# Patient Record
Sex: MALE | Race: WHITE | HISPANIC OR LATINO | URBAN - METROPOLITAN AREA
[De-identification: names, ages, dates, MRNs, and addresses within clinical notes are randomized per-mention and may not be internally consistent; named-entity substitution may affect disease eponyms.]

---

## 2020-11-05 ENCOUNTER — INPATIENT (INPATIENT)
Facility: HOSPITAL | Age: 50
LOS: 0 days | Discharge: HOME | DRG: 394 | End: 2020-11-06
Attending: INTERNAL MEDICINE | Admitting: INTERNAL MEDICINE
Payer: MEDICAID

## 2020-11-05 VITALS
TEMPERATURE: 98 F | RESPIRATION RATE: 19 BRPM | HEART RATE: 86 BPM | HEIGHT: 67 IN | WEIGHT: 214.95 LBS | SYSTOLIC BLOOD PRESSURE: 174 MMHG | OXYGEN SATURATION: 98 % | DIASTOLIC BLOOD PRESSURE: 116 MMHG

## 2020-11-05 NOTE — ED ADULT TRIAGE NOTE - PATIENT ON (OXYGEN DELIVERY METHOD)
decreased ability to use arms for pushing/pulling/decreased ability to use legs for bridging/pushing/impaired ability to control trunk for mobility
room air

## 2020-11-06 VITALS
DIASTOLIC BLOOD PRESSURE: 88 MMHG | SYSTOLIC BLOOD PRESSURE: 156 MMHG | RESPIRATION RATE: 18 BRPM | TEMPERATURE: 98 F | OXYGEN SATURATION: 98 % | HEART RATE: 89 BPM

## 2020-11-06 DIAGNOSIS — T18.5XXA FOREIGN BODY IN ANUS AND RECTUM, INITIAL ENCOUNTER: ICD-10-CM

## 2020-11-06 DIAGNOSIS — R03.0 ELEVATED BLOOD-PRESSURE READING, WITHOUT DIAGNOSIS OF HYPERTENSION: ICD-10-CM

## 2020-11-06 DIAGNOSIS — Z29.9 ENCOUNTER FOR PROPHYLACTIC MEASURES, UNSPECIFIED: ICD-10-CM

## 2020-11-06 LAB
24R-OH-CALCIDIOL SERPL-MCNC: 22.2 NG/ML — LOW (ref 30–80)
ALBUMIN SERPL ELPH-MCNC: 3.5 G/DL — SIGNIFICANT CHANGE UP (ref 3.5–5)
ALBUMIN SERPL ELPH-MCNC: 3.9 G/DL — SIGNIFICANT CHANGE UP (ref 3.5–5)
ALP SERPL-CCNC: 58 U/L — SIGNIFICANT CHANGE UP (ref 40–120)
ALP SERPL-CCNC: 63 U/L — SIGNIFICANT CHANGE UP (ref 40–120)
ALT FLD-CCNC: 37 U/L DA — SIGNIFICANT CHANGE UP (ref 10–60)
ALT FLD-CCNC: 42 U/L DA — SIGNIFICANT CHANGE UP (ref 10–60)
ANION GAP SERPL CALC-SCNC: 5 MMOL/L — SIGNIFICANT CHANGE UP (ref 5–17)
ANION GAP SERPL CALC-SCNC: 6 MMOL/L — SIGNIFICANT CHANGE UP (ref 5–17)
APTT BLD: 32 SEC — SIGNIFICANT CHANGE UP (ref 27.5–35.5)
AST SERPL-CCNC: 22 U/L — SIGNIFICANT CHANGE UP (ref 10–40)
AST SERPL-CCNC: 25 U/L — SIGNIFICANT CHANGE UP (ref 10–40)
BASOPHILS # BLD AUTO: 0.03 K/UL — SIGNIFICANT CHANGE UP (ref 0–0.2)
BASOPHILS # BLD AUTO: 0.03 K/UL — SIGNIFICANT CHANGE UP (ref 0–0.2)
BASOPHILS NFR BLD AUTO: 0.3 % — SIGNIFICANT CHANGE UP (ref 0–2)
BASOPHILS NFR BLD AUTO: 0.4 % — SIGNIFICANT CHANGE UP (ref 0–2)
BILIRUB SERPL-MCNC: 1.6 MG/DL — HIGH (ref 0.2–1.2)
BILIRUB SERPL-MCNC: 1.6 MG/DL — HIGH (ref 0.2–1.2)
BUN SERPL-MCNC: 14 MG/DL — SIGNIFICANT CHANGE UP (ref 7–18)
BUN SERPL-MCNC: 15 MG/DL — SIGNIFICANT CHANGE UP (ref 7–18)
CALCIUM SERPL-MCNC: 8.4 MG/DL — SIGNIFICANT CHANGE UP (ref 8.4–10.5)
CALCIUM SERPL-MCNC: 8.9 MG/DL — SIGNIFICANT CHANGE UP (ref 8.4–10.5)
CHLORIDE SERPL-SCNC: 105 MMOL/L — SIGNIFICANT CHANGE UP (ref 96–108)
CHLORIDE SERPL-SCNC: 105 MMOL/L — SIGNIFICANT CHANGE UP (ref 96–108)
CO2 SERPL-SCNC: 27 MMOL/L — SIGNIFICANT CHANGE UP (ref 22–31)
CO2 SERPL-SCNC: 28 MMOL/L — SIGNIFICANT CHANGE UP (ref 22–31)
CREAT SERPL-MCNC: 0.88 MG/DL — SIGNIFICANT CHANGE UP (ref 0.5–1.3)
CREAT SERPL-MCNC: 0.98 MG/DL — SIGNIFICANT CHANGE UP (ref 0.5–1.3)
EOSINOPHIL # BLD AUTO: 0.05 K/UL — SIGNIFICANT CHANGE UP (ref 0–0.5)
EOSINOPHIL # BLD AUTO: 0.05 K/UL — SIGNIFICANT CHANGE UP (ref 0–0.5)
EOSINOPHIL NFR BLD AUTO: 0.6 % — SIGNIFICANT CHANGE UP (ref 0–6)
EOSINOPHIL NFR BLD AUTO: 0.6 % — SIGNIFICANT CHANGE UP (ref 0–6)
GLUCOSE BLDC GLUCOMTR-MCNC: 103 MG/DL — HIGH (ref 70–99)
GLUCOSE BLDC GLUCOMTR-MCNC: 116 MG/DL — HIGH (ref 70–99)
GLUCOSE SERPL-MCNC: 105 MG/DL — HIGH (ref 70–99)
GLUCOSE SERPL-MCNC: 97 MG/DL — SIGNIFICANT CHANGE UP (ref 70–99)
HCT VFR BLD CALC: 43.5 % — SIGNIFICANT CHANGE UP (ref 39–50)
HCT VFR BLD CALC: 46.1 % — SIGNIFICANT CHANGE UP (ref 39–50)
HGB BLD-MCNC: 14.4 G/DL — SIGNIFICANT CHANGE UP (ref 13–17)
HGB BLD-MCNC: 15.2 G/DL — SIGNIFICANT CHANGE UP (ref 13–17)
IMM GRANULOCYTES NFR BLD AUTO: 0.1 % — SIGNIFICANT CHANGE UP (ref 0–1.5)
IMM GRANULOCYTES NFR BLD AUTO: 0.2 % — SIGNIFICANT CHANGE UP (ref 0–1.5)
INR BLD: 1.04 RATIO — SIGNIFICANT CHANGE UP (ref 0.88–1.16)
LYMPHOCYTES # BLD AUTO: 2.63 K/UL — SIGNIFICANT CHANGE UP (ref 1–3.3)
LYMPHOCYTES # BLD AUTO: 2.81 K/UL — SIGNIFICANT CHANGE UP (ref 1–3.3)
LYMPHOCYTES # BLD AUTO: 30.8 % — SIGNIFICANT CHANGE UP (ref 13–44)
LYMPHOCYTES # BLD AUTO: 32.4 % — SIGNIFICANT CHANGE UP (ref 13–44)
MAGNESIUM SERPL-MCNC: 2 MG/DL — SIGNIFICANT CHANGE UP (ref 1.6–2.6)
MCHC RBC-ENTMCNC: 30.7 PG — SIGNIFICANT CHANGE UP (ref 27–34)
MCHC RBC-ENTMCNC: 30.8 PG — SIGNIFICANT CHANGE UP (ref 27–34)
MCHC RBC-ENTMCNC: 33 GM/DL — SIGNIFICANT CHANGE UP (ref 32–36)
MCHC RBC-ENTMCNC: 33.1 GM/DL — SIGNIFICANT CHANGE UP (ref 32–36)
MCV RBC AUTO: 92.9 FL — SIGNIFICANT CHANGE UP (ref 80–100)
MCV RBC AUTO: 93.1 FL — SIGNIFICANT CHANGE UP (ref 80–100)
MONOCYTES # BLD AUTO: 0.72 K/UL — SIGNIFICANT CHANGE UP (ref 0–0.9)
MONOCYTES # BLD AUTO: 0.75 K/UL — SIGNIFICANT CHANGE UP (ref 0–0.9)
MONOCYTES NFR BLD AUTO: 8.4 % — SIGNIFICANT CHANGE UP (ref 2–14)
MONOCYTES NFR BLD AUTO: 8.7 % — SIGNIFICANT CHANGE UP (ref 2–14)
NEUTROPHILS # BLD AUTO: 5.01 K/UL — SIGNIFICANT CHANGE UP (ref 1.8–7.4)
NEUTROPHILS # BLD AUTO: 5.11 K/UL — SIGNIFICANT CHANGE UP (ref 1.8–7.4)
NEUTROPHILS NFR BLD AUTO: 57.8 % — SIGNIFICANT CHANGE UP (ref 43–77)
NEUTROPHILS NFR BLD AUTO: 59.7 % — SIGNIFICANT CHANGE UP (ref 43–77)
NRBC # BLD: 0 /100 WBCS — SIGNIFICANT CHANGE UP (ref 0–0)
NRBC # BLD: 0 /100 WBCS — SIGNIFICANT CHANGE UP (ref 0–0)
PHOSPHATE SERPL-MCNC: 3.5 MG/DL — SIGNIFICANT CHANGE UP (ref 2.5–4.5)
PLATELET # BLD AUTO: 276 K/UL — SIGNIFICANT CHANGE UP (ref 150–400)
PLATELET # BLD AUTO: 286 K/UL — SIGNIFICANT CHANGE UP (ref 150–400)
POTASSIUM SERPL-MCNC: 3.6 MMOL/L — SIGNIFICANT CHANGE UP (ref 3.5–5.3)
POTASSIUM SERPL-MCNC: 3.7 MMOL/L — SIGNIFICANT CHANGE UP (ref 3.5–5.3)
POTASSIUM SERPL-SCNC: 3.6 MMOL/L — SIGNIFICANT CHANGE UP (ref 3.5–5.3)
POTASSIUM SERPL-SCNC: 3.7 MMOL/L — SIGNIFICANT CHANGE UP (ref 3.5–5.3)
PROT SERPL-MCNC: 7 G/DL — SIGNIFICANT CHANGE UP (ref 6–8.3)
PROT SERPL-MCNC: 7.6 G/DL — SIGNIFICANT CHANGE UP (ref 6–8.3)
PROTHROM AB SERPL-ACNC: 12.3 SEC — SIGNIFICANT CHANGE UP (ref 10.6–13.6)
RBC # BLD: 4.68 M/UL — SIGNIFICANT CHANGE UP (ref 4.2–5.8)
RBC # BLD: 4.95 M/UL — SIGNIFICANT CHANGE UP (ref 4.2–5.8)
RBC # FLD: 12.6 % — SIGNIFICANT CHANGE UP (ref 10.3–14.5)
RBC # FLD: 12.6 % — SIGNIFICANT CHANGE UP (ref 10.3–14.5)
SARS-COV-2 IGG SERPL QL IA: NEGATIVE — SIGNIFICANT CHANGE UP
SARS-COV-2 IGM SERPL IA-ACNC: 0.09 INDEX — SIGNIFICANT CHANGE UP
SARS-COV-2 RNA SPEC QL NAA+PROBE: SIGNIFICANT CHANGE UP
SODIUM SERPL-SCNC: 138 MMOL/L — SIGNIFICANT CHANGE UP (ref 135–145)
SODIUM SERPL-SCNC: 138 MMOL/L — SIGNIFICANT CHANGE UP (ref 135–145)
TSH SERPL-MCNC: 1.53 UU/ML — SIGNIFICANT CHANGE UP (ref 0.34–4.82)
VIT B12 SERPL-MCNC: 358 PG/ML — SIGNIFICANT CHANGE UP (ref 232–1245)
WBC # BLD: 8.55 K/UL — SIGNIFICANT CHANGE UP (ref 3.8–10.5)
WBC # BLD: 8.67 K/UL — SIGNIFICANT CHANGE UP (ref 3.8–10.5)
WBC # FLD AUTO: 8.55 K/UL — SIGNIFICANT CHANGE UP (ref 3.8–10.5)
WBC # FLD AUTO: 8.67 K/UL — SIGNIFICANT CHANGE UP (ref 3.8–10.5)

## 2020-11-06 PROCEDURE — 88300 SURGICAL PATH GROSS: CPT | Mod: 26

## 2020-11-06 PROCEDURE — 86850 RBC ANTIBODY SCREEN: CPT

## 2020-11-06 PROCEDURE — 45915 REMOVE RECTAL OBSTRUCTION: CPT

## 2020-11-06 PROCEDURE — 85730 THROMBOPLASTIN TIME PARTIAL: CPT

## 2020-11-06 PROCEDURE — 83735 ASSAY OF MAGNESIUM: CPT

## 2020-11-06 PROCEDURE — 74176 CT ABD & PELVIS W/O CONTRAST: CPT

## 2020-11-06 PROCEDURE — 86769 SARS-COV-2 COVID-19 ANTIBODY: CPT

## 2020-11-06 PROCEDURE — 74176 CT ABD & PELVIS W/O CONTRAST: CPT | Mod: 26

## 2020-11-06 PROCEDURE — 36415 COLL VENOUS BLD VENIPUNCTURE: CPT

## 2020-11-06 PROCEDURE — 99053 MED SERV 10PM-8AM 24 HR FAC: CPT

## 2020-11-06 PROCEDURE — 80053 COMPREHEN METABOLIC PANEL: CPT

## 2020-11-06 PROCEDURE — 82962 GLUCOSE BLOOD TEST: CPT

## 2020-11-06 PROCEDURE — 86900 BLOOD TYPING SEROLOGIC ABO: CPT

## 2020-11-06 PROCEDURE — 99253 IP/OBS CNSLTJ NEW/EST LOW 45: CPT | Mod: 25

## 2020-11-06 PROCEDURE — 85025 COMPLETE CBC W/AUTO DIFF WBC: CPT

## 2020-11-06 PROCEDURE — 84443 ASSAY THYROID STIM HORMONE: CPT

## 2020-11-06 PROCEDURE — 99222 1ST HOSP IP/OBS MODERATE 55: CPT

## 2020-11-06 PROCEDURE — 82607 VITAMIN B-12: CPT

## 2020-11-06 PROCEDURE — 86901 BLOOD TYPING SEROLOGIC RH(D): CPT

## 2020-11-06 PROCEDURE — 88300 SURGICAL PATH GROSS: CPT

## 2020-11-06 PROCEDURE — 87635 SARS-COV-2 COVID-19 AMP PRB: CPT

## 2020-11-06 PROCEDURE — 84100 ASSAY OF PHOSPHORUS: CPT

## 2020-11-06 PROCEDURE — 82306 VITAMIN D 25 HYDROXY: CPT

## 2020-11-06 PROCEDURE — 99285 EMERGENCY DEPT VISIT HI MDM: CPT

## 2020-11-06 PROCEDURE — 96374 THER/PROPH/DIAG INJ IV PUSH: CPT

## 2020-11-06 PROCEDURE — 99285 EMERGENCY DEPT VISIT HI MDM: CPT | Mod: 25

## 2020-11-06 PROCEDURE — 85610 PROTHROMBIN TIME: CPT

## 2020-11-06 RX ORDER — MORPHINE SULFATE 50 MG/1
4 CAPSULE, EXTENDED RELEASE ORAL ONCE
Refills: 0 | Status: DISCONTINUED | OUTPATIENT
Start: 2020-11-06 | End: 2020-11-06

## 2020-11-06 RX ORDER — SODIUM CHLORIDE 9 MG/ML
1000 INJECTION, SOLUTION INTRAVENOUS
Refills: 0 | Status: DISCONTINUED | OUTPATIENT
Start: 2020-11-06 | End: 2020-11-06

## 2020-11-06 RX ORDER — MOXIFLOXACIN HYDROCHLORIDE TABLETS, 400 MG 400 MG/1
1 TABLET, FILM COATED ORAL
Qty: 14 | Refills: 0
Start: 2020-11-06 | End: 2020-11-12

## 2020-11-06 RX ORDER — SODIUM CHLORIDE 9 MG/ML
1000 INJECTION INTRAMUSCULAR; INTRAVENOUS; SUBCUTANEOUS
Refills: 0 | Status: DISCONTINUED | OUTPATIENT
Start: 2020-11-06 | End: 2020-11-06

## 2020-11-06 RX ORDER — SODIUM CHLORIDE 9 MG/ML
1000 INJECTION INTRAMUSCULAR; INTRAVENOUS; SUBCUTANEOUS ONCE
Refills: 0 | Status: COMPLETED | OUTPATIENT
Start: 2020-11-06 | End: 2020-11-06

## 2020-11-06 RX ORDER — MORPHINE SULFATE 50 MG/1
2 CAPSULE, EXTENDED RELEASE ORAL ONCE
Refills: 0 | Status: DISCONTINUED | OUTPATIENT
Start: 2020-11-06 | End: 2020-11-06

## 2020-11-06 RX ORDER — LISINOPRIL 2.5 MG/1
2.5 TABLET ORAL ONCE
Refills: 0 | Status: DISCONTINUED | OUTPATIENT
Start: 2020-11-06 | End: 2020-11-06

## 2020-11-06 RX ORDER — AMLODIPINE BESYLATE 2.5 MG/1
5 TABLET ORAL ONCE
Refills: 0 | Status: DISCONTINUED | OUTPATIENT
Start: 2020-11-06 | End: 2020-11-06

## 2020-11-06 RX ORDER — ACETAMINOPHEN 500 MG
650 TABLET ORAL EVERY 6 HOURS
Refills: 0 | Status: DISCONTINUED | OUTPATIENT
Start: 2020-11-06 | End: 2020-11-06

## 2020-11-06 RX ORDER — ENOXAPARIN SODIUM 100 MG/ML
40 INJECTION SUBCUTANEOUS DAILY
Refills: 0 | Status: DISCONTINUED | OUTPATIENT
Start: 2020-11-06 | End: 2020-11-06

## 2020-11-06 RX ORDER — METRONIDAZOLE 500 MG
1 TABLET ORAL
Qty: 21 | Refills: 0
Start: 2020-11-06 | End: 2020-11-12

## 2020-11-06 RX ADMIN — Medication 1 ENEMA: at 13:36

## 2020-11-06 RX ADMIN — MORPHINE SULFATE 4 MILLIGRAM(S): 50 CAPSULE, EXTENDED RELEASE ORAL at 01:05

## 2020-11-06 RX ADMIN — MORPHINE SULFATE 4 MILLIGRAM(S): 50 CAPSULE, EXTENDED RELEASE ORAL at 01:30

## 2020-11-06 RX ADMIN — SODIUM CHLORIDE 1000 MILLILITER(S): 9 INJECTION INTRAMUSCULAR; INTRAVENOUS; SUBCUTANEOUS at 02:00

## 2020-11-06 RX ADMIN — SODIUM CHLORIDE 1000 MILLILITER(S): 9 INJECTION INTRAMUSCULAR; INTRAVENOUS; SUBCUTANEOUS at 01:05

## 2020-11-06 RX ADMIN — MORPHINE SULFATE 2 MILLIGRAM(S): 50 CAPSULE, EXTENDED RELEASE ORAL at 14:13

## 2020-11-06 RX ADMIN — MORPHINE SULFATE 2 MILLIGRAM(S): 50 CAPSULE, EXTENDED RELEASE ORAL at 15:00

## 2020-11-06 NOTE — ED PROVIDER NOTE - CLINICAL SUMMARY MEDICAL DECISION MAKING FREE TEXT BOX
50 year old male with rectal foreign body. vitals WNL. PE as above. 50 year old male with rectal foreign body. vitals WNL. PE as above.  labs unremarkable. ct with anal.rectal chicken bone. unable to remove. will admit for GI dr mcgrath for colonoscopy.

## 2020-11-06 NOTE — DISCHARGE NOTE PROVIDER - NSDCMRMEDTOKEN_GEN_ALL_CORE_FT
Cipro 500 mg oral tablet: 1 tab(s) orally 2 times a day   Flagyl 500 mg oral tablet: 1 tab(s) orally 3 times a day

## 2020-11-06 NOTE — CONSULT NOTE ADULT - ASSESSMENT
50 y.o. M with ingestion of foreign body    -Foreign body removed at bedside with clamp. Bone removed from rectum. Pt tolerated procedure with alleviation of pain  -Rigid sigmoidoscopy performed at bedside. No further foreign body noted up to 15cm from anus.  -Resume diet as tolerated.  -Outpt f/u with PMD and GI   50 y.o. M with ingestion of foreign body    -Foreign body removed at bedside with clamp. Bone removed from rectum. Pt tolerated procedure with alleviation of pain  -Rigid sigmoidoscopy performed at bedside. No further foreign body noted up to 15cm from anus.  -Resume diet as tolerated.  -Recommend d/c with PO augmentin  -Outpt f/u with Dr. Cuello

## 2020-11-06 NOTE — DISCHARGE NOTE NURSING/CASE MANAGEMENT/SOCIAL WORK - PATIENT PORTAL LINK FT
You can access the FollowMyHealth Patient Portal offered by [facility] by registering at the following website: [email or web address]. By joining [facility]’s FollowMyHealth portal, you will also be able to view your health information using other applications (apps) compatible with our system.

## 2020-11-06 NOTE — H&P ADULT - NSHPREVIEWOFSYSTEMS_GEN_ALL_CORE
.  CONSTITUTIONAL: No weakness, fevers or chills  EYES/ENT: No visual changes;  No vertigo or throat pain   NECK: No pain or stiffness  RESPIRATORY: No cough, wheezing, hemoptysis; No shortness of breath  CARDIOVASCULAR: No chest pain or palpitations  GASTROINTESTINAL: No abdominal or epigastric pain. No nausea, vomiting, or hematemesis; Rectal pain  GENITOURINARY: No dysuria, frequency or hematuria  NEUROLOGICAL: No numbness or weakness  SKIN: No itching, burning, rashes, or lesions   All other review of systems is negative unless indicated above.

## 2020-11-06 NOTE — PROCEDURE NOTE - GENERAL PROCEDURE DETAILS
KAITLIN performed, foreign body palpable. Clamp inserted into rectum with guidance of KAITLIN. Bone removed. Rigid sigmoidoscopy performed after without evidence of retained FB in rectal cavity up to 15cm.

## 2020-11-06 NOTE — DISCHARGE NOTE PROVIDER - PROVIDER TOKENS
PROVIDER:[TOKEN:[[id number]]] PROVIDER:[TOKEN:[[id number]],FOLLOWUP:[2 weeks]],PROVIDER:[TOKEN:[[id number]],FOLLOWUP:[1 month]]

## 2020-11-06 NOTE — ED PROVIDER NOTE - OBJECTIVE STATEMENT
50 year old male denies PMH coming in with rectal pain. pt states yesterday ate chicken and swallowed a bone by accident- states initially it got stuck in his throat and then eventually went into stomach and he thought it was ok. States today when had a BM had severe pain. states looked outside and didn't see anything wrong and then checked inside and could feel something hard and thought it was the chicken bone. denies all other complaints.

## 2020-11-06 NOTE — H&P ADULT - HISTORY OF PRESENT ILLNESS
50 year old male with no PMH is coming in with rectal pain. Patient states yesterday ate chicken and swallowed a bone by accident, He states initially it got stuck in his throat and then eventually went into stomach and he thought it was ok. States today when had a BM had severe pain. Patient states the he looked outside and didn't see anything wrong like hemorrhoids  and then checked inside and could feel something hard and thought it was the chicken bone.   Patient denies blood pre rectum, abdominal pain    ED Course: ED physician tried to manually remove the piece without success.  CR abdomen does not show perforation     Vital Signs Last 24 Hrs  T(C): 36.8 (06 Nov 2020 03:30), Max: 36.8 (06 Nov 2020 03:30)  T(F): 98.2 (06 Nov 2020 03:30), Max: 98.2 (06 Nov 2020 03:30)  HR: 89 (06 Nov 2020 03:30) (86 - 89)  BP: 169/112 (06 Nov 2020 03:30) (169/112 - 174/116)  BP(mean): --  RR: 18 (06 Nov 2020 03:30) (18 - 19)  SpO2: 99% (06 Nov 2020 03:30) (98% - 99%) 50 year old male with no PMH is coming in with rectal pain. Patient states yesterday ate chicken and swallowed a bone by accident, He states initially it got stuck in his throat and then eventually went into stomach and he thought it was ok. States today when had a BM had severe pain. Patient states the he looked outside and didn't see anything wrong like hemorrhoids  and then checked inside and could feel something hard and thought it was the chicken bone.   Patient denies blood pre rectum, abdominal pain    ED Course: ED physician tried to manually remove the piece without success.  CR abdomen does not show perforation     Vital Signs Last 24 Hrs  T(C): 36.8 (06 Nov 2020 03:30), Max: 36.8 (06 Nov 2020 03:30)  T(F): 98.2 (06 Nov 2020 03:30), Max: 98.2 (06 Nov 2020 03:30)  HR: 89 (06 Nov 2020 03:30) (86 - 89)  BP: 169/112 (06 Nov 2020 03:30) (169/112 - 174/116)  RR: 18 (06 Nov 2020 03:30) (18 - 19)  SpO2: 99% (06 Nov 2020 03:30) (98% - 99%)

## 2020-11-06 NOTE — DISCHARGE NOTE PROVIDER - NSDCFUADDINST_GEN_ALL_CORE_FT
Folow up with PCP as outpatient  If you do not have a PCP may follow up with our Primary care provider at [address] [place] [address], Dr. Pace as per information provided.     Exercise 30 mins daily at least 5 days a week.    You will need to follow up with a stomach Doctor Gastroenterologists in about 4 to 6 weeks for a Colonoscopy if not done recently as you had evidence of inflammation in your Colon Folow up with PCP as outpatient  If you do not have a PCP may follow up with our Primary care provider at [address] [place] [address], Dr. Pace as per information provided.     Exercise 30 mins daily at least 5 days a week.    You will need to follow up with a stomach Doctor Gastroenterologists in about 4 to 6 weeks for a Colonoscopy if not done recently as you had evidence of inflammation in your Colon. You may follow up with Dr. Perea at [address] [place] [address] as advised.     You have also been provided with information for follow up at [facility] or [facility] for follow up in the event your Insurance has not kicked in.

## 2020-11-06 NOTE — CONSULT NOTE ADULT - ATTENDING COMMENTS
Pt w/ accidental ingestion of a chicken bone fragment that is now stuck in his distal rectum/anal canal causing him pain. Pt also w/ findings of acute diverticulitis on the CT though pt denies any real abd pain.    abd soft, non-distended  palpable foreign body in distal rectum which was removed w/ a clamp and rigid sigmoidoscopy performed afterwards demonstrating normal appearing mucosa w/ scant amount of blood    - okay for diet and to d/c  - recommend 7days of augmentin for mild diverticulitis  - f/u in surgery office in 1week and will also plan to schedule for colonoscopy

## 2020-11-06 NOTE — DISCHARGE NOTE PROVIDER - NSDCCPCAREPLAN_GEN_ALL_CORE_FT
PRINCIPAL DISCHARGE DIAGNOSIS  Diagnosis: Rectal foreign body, initial encounter  Assessment and Plan of Treatment: You presented because of severely painful bowel movement and felt something hard in your anus after swallowing a chick bone the day before. in the ED, ED physician tried to manually remove the piece without success.XR abdomen didnot show perforation. CT abdomen showed  A 2.9 cm radiopaque foreign body in the anus likely reflects the ingested chicken bone. No evidence for anal perforation. Mild acute diverticulitis of the proximal sigmoid colon, possibly related to passage of the foreign body along the colon. You were seen by gastroenterologist and surgery and they >>>.        SECONDARY DISCHARGE DIAGNOSES  Diagnosis: Elevated blood pressure reading  Assessment and Plan of Treatment: Your blood pressure was high at time of presentation likely due to pain and stress.Your blood pressure normalized without any intervention. You are recommended to follow up with your PCP after discharge.     PRINCIPAL DISCHARGE DIAGNOSIS  Diagnosis: Rectal foreign body, initial encounter  Assessment and Plan of Treatment: You presented because of severely painful bowel movement and felt something hard in your anus after swallowing a chick bone the day before. in the ED, ED physician tried to manually remove the piece without success.XR abdomen didnot show perforation. CT abdomen showed  A 2.9 cm radiopaque foreign body in the anus likely reflects the ingested chicken bone. No evidence for anal perforation. Mild acute diverticulitis of the proximal sigmoid colon, possibly related to passage of the foreign body along the colon. You were seen by gastroenterologist. Gastroenterology reffered to surgery.  Foreign body was removed by surgery team at bedside. You need to cointinue oral antibiotics as above for 7 days for possible infection. Please follow up with your primary care provider.        SECONDARY DISCHARGE DIAGNOSES  Diagnosis: Elevated blood pressure reading  Assessment and Plan of Treatment: Your blood pressure was high at time of presentation likely due to pain and stress.Your blood pressure normalized without any intervention. You are recommended to follow up with your PCP after discharge.     PRINCIPAL DISCHARGE DIAGNOSIS  Diagnosis: Rectal foreign body, initial encounter  Assessment and Plan of Treatment: You presented because of severely painful bowel movement and felt something hard in your anus after swallowing a chicken bone inadvertently the day before. in the ED, ED physician tried to manually remove the piece without success.X-Ray Abdomen did not show any perforation. CT abdomen showed  a 2.9 cm radiopaque foreign body in the anus likely reflects the ingested chicken bone. No evidence for anal perforation. Mild acute diverticulitis of the proximal sigmoid colon, possibly related to passage of the foreign body along the colon. You were seen by gastroenterologist. Gastroenterology referred to surgery for it to be removed given mild Diverticulitis (Inflammation of colon).   Foreign body was removed by surgery team at bedside. You need to cointinue oral antibiotics as above for 7 days for possible infection. Please follow up with your primary care provider and also with a Gastroeneterologist in about 6 weeks to have a Colonoscopy.         SECONDARY DISCHARGE DIAGNOSES  Diagnosis: Diverticulitis of sigmoid colon  Assessment and Plan of Treatment: You was noted to have some inflammation in the colon likely due to irritation from the foreign body.  You will need to follow up with a stomach Doctor Gastroenterologist in about 4 to 6 weeks for a Colonoscopy if not done recently as you had evidence of inflammation in your Colon    Diagnosis: Elevated blood pressure reading  Assessment and Plan of Treatment: Your blood pressure was high at time of presentation likely due to pain and stress.Your blood pressure normalized without any intervention. You are recommended to follow up with your PCP after discharge.     PRINCIPAL DISCHARGE DIAGNOSIS  Diagnosis: Rectal foreign body, initial encounter  Assessment and Plan of Treatment: You presented because of severely painful bowel movement and felt something hard in your anus after swallowing a chicken bone inadvertently the day before. in the ED, ED physician tried to manually remove the piece without success.X-Ray Abdomen did not show any perforation. CT abdomen showed  a 2.9 cm radiopaque foreign body in the anus likely reflects the ingested chicken bone. No evidence for anal perforation. Mild acute diverticulitis of the proximal sigmoid colon, possibly related to passage of the foreign body along the colon. You were seen by gastroenterologist. Gastroenterology referred to surgery for it to be removed given mild Diverticulitis (Inflammation of colon).   Foreign body was removed by surgery team at bedside. You need to cointinue oral antibiotics as above for 7 days for possible infection. Please follow up with your primary care provider and also with a Gastroeneterologist in about 6 weeks to have a Colonoscopy.         SECONDARY DISCHARGE DIAGNOSES  Diagnosis: Diverticulitis of sigmoid colon  Assessment and Plan of Treatment: You was noted to have some inflammation in the colon likely due to irritation from the foreign body.  You will need to follow up with a stomach Doctor Gastroenterologist in about 4 to 6 weeks for a Colonoscopy if not done recently as you had evidence of inflammation in your Colon  Please take plain Yoghurt twice daily while on antibiotics to minimize diarrhea associated with Antibiotics.    Diagnosis: Elevated blood pressure reading  Assessment and Plan of Treatment: Your blood pressure was high at time of presentation likely due to pain and stress.Your blood pressure normalized without any intervention. You are recommended to follow up with your PCP after discharge.

## 2020-11-06 NOTE — ED ADULT NURSE NOTE - OBJECTIVE STATEMENT
The patient presents with rectal pain since yesterday at 0900 post bowel movement.  He states that he ingested a chicken bone yesterday and thinks that the bone has descended in his rectum.  He denies blood in the stool, nausea, vomiting.

## 2020-11-06 NOTE — ED ADULT NURSE NOTE - CHPI ED NUR SYMPTOMS NEG
no diarrhea/no dysuria/no blood in stool/no chills/no vomiting/no hematuria/no fever/no nausea/no burning urination

## 2020-11-06 NOTE — H&P ADULT - PROBLEM SELECTOR PLAN 2
Blood pressure running high  Patient denies having blood pressure problem, however, he reports that he was prescribed Metoprolol when he was young and was took off.  High blood pressure can be due to pain also   If it is persistently high,. can give low dose lisinopril   f/u lipid profile

## 2020-11-06 NOTE — ED ADULT NURSE NOTE - NSFALLRSKPASTHIST_ED_ALL_ED
NAHUM met with patient to review 61 51 81 - pt declined signing 201 stating "I don't need to stay here, I'm only here because I have a bladder infection, I want to go home";   Pt cannot recall details of admission other than "I knew I had a bladder infection and needed to go to the hospital";  Pt AOx3 otherwise;  NAHUM will work with [facility] for discharge Friday due to lack of criteria for 303 no

## 2020-11-06 NOTE — DISCHARGE NOTE PROVIDER - HOSPITAL COURSE
50 year old male with no PMH is coming in with rectal pain. Patient states yesterday ate chicken and swallowed a bone by accident, He states initially it got stuck in his throat and then eventually went into stomach and he thought it was ok. Stated when had a BM had severe pain. Patient states the he looked outside and didn't see anything wrong like hemorrhoids  and then checked inside and could feel something hard and thought it was the chicken bone.   Patient denies blood pre rectum, abdominal pain. in the ED, ED physician tried to manually remove the piece without success.CR abdomen didnot show perforation. CT abdomen showed  A 2.9 cm radiopaque foreign body in the anus likely reflects the ingested chicken bone. No evidence for anal perforation. Mild acute diverticulitis of the proximal sigmoid colon, possibly related to passage of the foreign body along the colon. surgery and GIT were consulted .       50 year old male with no PMH is coming in with rectal pain. Patient states yesterday ate chicken and swallowed a bone by accident, He states initially it got stuck in his throat and then eventually went into stomach and he thought it was ok. Stated when had a BM had severe pain. Patient states the he looked outside and didn't see anything wrong like hemorrhoids  and then checked inside and could feel something hard and thought it was the chicken bone.   Patient denies blood pre rectum, abdominal pain. in the ED, ED physician tried to manually remove the piece without success.CR abdomen didnot show perforation. CT abdomen showed  A 2.9 cm radiopaque foreign body in the anus likely reflects the ingested chicken bone. No evidence for anal perforation. Mild acute diverticulitis of the proximal sigmoid colon, possibly related to passage of the foreign body.  Gastroenterology Dr. Story was consulted by ED who referred surgery evaluation.  Surgery removed the foreign body at bedside.   Pt is stable for discharge. Pt is being discharged  with oral antibiotics. Case has been discussed with the attending.  This is just a summary of the case. For further information please refer to pt chart document.

## 2020-11-06 NOTE — CONSULT NOTE ADULT - SUBJECTIVE AND OBJECTIVE BOX
Patient is a 50y old  Male who presents with a chief complaint of Rectal Foreign Object (06 Nov 2020 15:17)      HPI  Called see and eval 50y.o. Male w/ no PMH for ingestion of foreign body. Pt presented to [facility] ER today c/o rectal pain. Exacerbated by bearing down and squeezing his buttocks. Pain during defecation with possibly small amount of blood in stool in 1 episode. Pt states he ate chicken 2 days ago and thinks he may have swallowed a bone. Pt states he felt something hard when palpating his rectum which he thinks might be bone. Currently resting comfortably in fetal position.     MEDICATIONS  (STANDING):  dextrose 5% + sodium chloride 0.9%. 1000 milliLiter(s) (70 mL/Hr) IV Continuous <Continuous>  enoxaparin Injectable 40 milliGRAM(s) SubCutaneous daily    MEDICATIONS  (PRN):  acetaminophen   Tablet .. 650 milliGRAM(s) Oral every 6 hours PRN Mild Pain (1 - 3)    Allergies    No Known Allergies    Vital Signs Last 24 Hrs  T(C): 36.7 (06 Nov 2020 13:15), Max: 37 (06 Nov 2020 06:32)  T(F): 98.1 (06 Nov 2020 13:15), Max: 98.6 (06 Nov 2020 06:32)  HR: 89 (06 Nov 2020 13:15) (85 - 91)  BP: 156/88 (06 Nov 2020 13:15) (139/78 - 174/116)  BP(mean): --  RR: 18 (06 Nov 2020 13:15) (17 - 19)  SpO2: 98% (06 Nov 2020 13:15) (98% - 99%)    Physical:  Gen: A&Ox3. NAD  Abd: Soft ND, NT  Rectal: Good sphincter tone. Palpable sharp, hard foreign body in rectum.     RADIOLOGY & ADDITIONAL STUDIES:  < from: CT Abdomen and Pelvis No Cont (11.06.20 @ 01:28) >  BOWEL: A 2.9 cm radiopaque foreign body is seen in the anus. No extraluminal gas. No bowel obstruction. Appendix normal. Colonic diverticulosis. There is mild inflammation surrounding a proximal sigmoid diverticulum.    < end of copied text >

## 2020-11-06 NOTE — ED PROVIDER NOTE - PROGRESS NOTE DETAILS
no perforation. attempted removal with speculum and forceps but unsuccessful. GI dr mcgrath consulted- advised admission for colonoscopy in AM. will admit.

## 2020-11-06 NOTE — DISCHARGE NOTE PROVIDER - CARE PROVIDER_API CALL
Ma, [facility]  MEDICINE - [facility]  [address], [address]  [place], [place] [id number]  Phone: [phone]  Fax: [phone]  Follow Up Time:    Ma, [facility] MEDICINE  [address], [address]  [place], [place] [id number]  Phone: [phone]  Fax: [phone]  Follow Up Time: 2 weeks    Gilberto Perea)  Gastroenterology  [address] [address] [place] [address]  Phone: [phone]  Fax: [phone]  Follow Up Time: 1 month

## 2020-11-06 NOTE — H&P ADULT - ASSESSMENT
50 year old male with no PMH is coming in with rectal pain. PAtient is beng admitted to get colonoscopy for foreign body removal

## 2020-11-06 NOTE — DISCHARGE NOTE PROVIDER - CARE PROVIDERS DIRECT ADDRESSES
[email or web address]_[email or web address]

## 2020-11-06 NOTE — H&P ADULT - PROBLEM SELECTOR PLAN 3
RISK                                                          Points  [] Previous VTE                                           3  [] Thrombophilia                                        2  [] Lower limb paralysis                              2   [] Current Cancer                                       2   [x] Immobilization > 24 hrs                        1  [] ICU/CCU stay > 24 hours                       1  [x] Age > 60                                                   1    Lovenox 40mg SQ

## 2020-11-06 NOTE — H&P ADULT - ATTENDING COMMENTS
Pt seen and examined  Case discussed with MAR.  50 year old man with no medical history with rectal pain preceded by history of swallowing chicken bone with transient throat pain.     Vital Signs Last 24 Hrs  T(C): 36.7 (05 Nov 2020 23:56), Max: 36.7 (05 Nov 2020 23:56)  T(F): 98 (05 Nov 2020 23:56), Max: 98 (05 Nov 2020 23:56)  HR: 86 (05 Nov 2020 23:56) (86 - 86)  BP: 174/116 (05 Nov 2020 23:56) (174/116 - 174/116)  RR: 19 (05 Nov 2020 23:56) (19 - 19)  SpO2: 98% (05 Nov 2020 23:56) (98% - 98%)    Labs                        15.2   8.67  )-----------( 286      ( 06 Nov 2020 01:03 )             46.1       138  |  105  |  15  --------------------<  105<H>  3.7   |  28  |  0.98  Ca    8.9      06 Nov 2020 01:03  TPro  7.6  /  Alb  3.9  /  TBili  1.6<H>  /  DBili  x   /  AST  25  /  ALT  42  /  AlkPhos  63  11-06    CT abdomen /pelvis  A 2.9 cm radiopaque foreign body is seen in the anus. No extraluminal gas. No bowel obstruction. Appendix normal. Colonic diverticulosis. There is mild inflammation surrounding a proximal sigmoid diverticulum. PERITONEUM: No fluid collection, free air, or ascites.      Impression  - Impacted rectal foreign body   No evidence of rupture   GI consulted in the ED  For anoscopy /sigmoidoscopy /colonoscopy in AM  Gentle hydration  NPO  Pain control Pt seen and examined  Case discussed with MAR.  50 year old man with no medical history with rectal pain preceded by history of swallowing chicken bone with transient throat pain.     Vital Signs Last 24 Hrs  T(C): 36.7 (05 Nov 2020 23:56), Max: 36.7 (05 Nov 2020 23:56)  T(F): 98 (05 Nov 2020 23:56), Max: 98 (05 Nov 2020 23:56)  HR: 86 (05 Nov 2020 23:56) (86 - 86)  BP: 174/116 (05 Nov 2020 23:56) (174/116 - 174/116)  RR: 19 (05 Nov 2020 23:56) (19 - 19)  SpO2: 98% (05 Nov 2020 23:56) (98% - 98%)    Well built middle age man, NAD AAO X 3  Exams unremarkable     Labs                        15.2   8.67  )-----------( 286      ( 06 Nov 2020 01:03 )             46.1       138  |  105  |  15  --------------------<  105<H>  3.7   |  28  |  0.98  Ca    8.9      06 Nov 2020 01:03  TPro  7.6  /  Alb  3.9  /  TBili  1.6<H>  /  DBili  x   /  AST  25  /  ALT  42  /  AlkPhos  63  11-06    CT abdomen /pelvis  A 2.9 cm radiopaque foreign body is seen in the anus. No extraluminal gas. No bowel obstruction. Appendix normal. Colonic diverticulosis. There is mild inflammation surrounding a proximal sigmoid diverticulum. PERITONEUM: No fluid collection, free air, or ascites.      Impression  - Impacted rectal foreign body   No evidence of rupture   GI consulted in the ED  For anoscopy /sigmoidoscopy /colonoscopy in AM  Gentle hydration  NPO  Pain control

## 2020-11-06 NOTE — H&P ADULT - PROBLEM SELECTOR PLAN 1
Patient had ingested chicken bone yesterday which was no painful   Today after he had bowel movement in the morninf, he started having severe discomfort in the rectal area  Failed attempt of foreign body removal in ED   Patient denies abdominal pain, obstipation, nausea,. vomiting   Able to pass gas  Last BM in the evening and it was painful  Will keep patient NPO for now   Blood pressure monitoring as it running high,  careful IV hydration   Pain management with tylenol for now, can give toradol PRN    Dr Story consulted by ED physician, will get colonoscopy in AM

## 2020-11-10 LAB — SURGICAL PATHOLOGY STUDY: SIGNIFICANT CHANGE UP

## 2023-08-24 NOTE — ED ADULT TRIAGE NOTE - CCCP TRG CHIEF CMPLNT
Anticoagulation Progress Note    Indication: Chronic Thromboembolic Pulmonary HTN dx via Pulmonary angiogram (7/2019), PE (02/2010)  Goal INR: 2.0-3.0  Referring Provider: Lukas Biggs MD  Supervising Provider: Lukas Biggs MD   Duration: Long-term  Order Date Expiration: 08/08/24  Pertinent History: Abnormal VQ scan 6/2019, P HTN clinic recommended pulmonary angiogram for f/u. PE (2010) provoked by sx, treated with warfarin x6 months.   Seen by Pulm HTN clinic 07/16/20 - found to have L leg swelling; venous US found acute left posterior tibial DVT     Assessment  Yes No   []  [x] Missed doses:    []  [x] Extra doses:   []  [x] Significant medication changes (RX, OTC, Herbal):    [x]  [] High-risk maintenance medications:   Aspirin             []  [x] Vitamin K / dietary changes (Vitamin K goal: 2 cups/week):     []  [x] Bleeding / bruising:   []  [x] Falls / injury:    []  [x] Acute illness:   None   []  [x] Alcohol intake:   []  [x] Procedures / hospitalization / ER visits:     []  [x] Other:        Plan (5mg tablets)  INR Result: 2.7  The INR result for today is therapeutic based on the INR goal 2.0-3.0.  Etiology: None  Recommended Dose: CPM Warfarin 5mgM, 2.5mgROW   Follow-Up: 09/19/23   Comments:    None                                        Counseling   Counseled about signs/symptoms of bruising/bleeding and appropriate management  Counseled about prevention and management of nosebleeds  Counseled about signs/symptoms of thrombosis and/or stroke and when to seek emergent care  Stressed importance of compliance with exact recommended dosage regimen  Stressed importance of compliance with consistent vitamin K intake  Discussed measures to reduce risk for falls and appropriate action when serious injury occurs  Instructed to notify clinic about medication changes or health status changes  Instructed to notify clinic about scheduled procedures  Discussed risk of thrombosis and/or bleeding with  inappropriate anticoagulant use    Provided patient/caregiver with written and verbal dosing instructions, patient/caregiver verbalized understanding.      Kedar Conway, PharmD, [facility]  [facility] Pharmacist  Anticoagulation/Heart Failure/Chronic Disease Management   rectal pain/pain, rectal